# Patient Record
Sex: FEMALE | Race: WHITE | NOT HISPANIC OR LATINO | Employment: OTHER | URBAN - METROPOLITAN AREA
[De-identification: names, ages, dates, MRNs, and addresses within clinical notes are randomized per-mention and may not be internally consistent; named-entity substitution may affect disease eponyms.]

---

## 2022-09-21 ENCOUNTER — HOSPITAL ENCOUNTER (EMERGENCY)
Facility: HOSPITAL | Age: 72
Discharge: HOME | End: 2022-09-21
Attending: EMERGENCY MEDICINE
Payer: MEDICARE

## 2022-09-21 VITALS
DIASTOLIC BLOOD PRESSURE: 77 MMHG | RESPIRATION RATE: 16 BRPM | WEIGHT: 125 LBS | HEIGHT: 61 IN | TEMPERATURE: 97.5 F | SYSTOLIC BLOOD PRESSURE: 189 MMHG | HEART RATE: 78 BPM | OXYGEN SATURATION: 96 % | BODY MASS INDEX: 23.6 KG/M2

## 2022-09-21 DIAGNOSIS — T78.40XA ALLERGIC REACTION: Primary | ICD-10-CM

## 2022-09-21 LAB — GLUCOSE SERPL-MCNC: 304 MG/DL (ref 65–140)

## 2022-09-21 PROCEDURE — 82948 REAGENT STRIP/BLOOD GLUCOSE: CPT

## 2022-09-21 PROCEDURE — 99283 EMERGENCY DEPT VISIT LOW MDM: CPT

## 2022-09-21 PROCEDURE — 99285 EMERGENCY DEPT VISIT HI MDM: CPT | Performed by: PHYSICIAN ASSISTANT

## 2022-09-21 RX ORDER — DIPHENHYDRAMINE HCL 25 MG
50 TABLET ORAL ONCE
Status: COMPLETED | OUTPATIENT
Start: 2022-09-21 | End: 2022-09-21

## 2022-09-21 RX ORDER — ATORVASTATIN CALCIUM 20 MG/1
20 TABLET, FILM COATED ORAL DAILY
COMMUNITY

## 2022-09-21 RX ORDER — LOSARTAN POTASSIUM 50 MG/1
50 TABLET ORAL DAILY
COMMUNITY

## 2022-09-21 RX ADMIN — DIPHENHYDRAMINE HYDROCHLORIDE 25 MG: 25 TABLET ORAL at 10:41

## 2022-09-21 NOTE — ED NOTES
Patient refused 50 mg benadryl  25 mg benadryl administered        Edmar Herzog  09/21/22 1125 Sarah  09/21/22 1049

## 2022-09-21 NOTE — ED PROVIDER NOTES
History  Chief Complaint   Patient presents with    Allergic Reaction     Pt arrives after taking rybelsus @0730 and then feeling face tightness @ 0900      Patient is a 72 y/o F with h/o DM, Hyperlipidemia that presents to the ED with possible allergic reaction to Rybelsus  She states she took her first pill this morning at 7:30AM   AT 9:00AM she was eating and her face felt flushed and tight and she felt short of breath and nauseated  She called her PCP and was told to go to the ER because she is having an allergic reaction  Patient states she is feeling better  History provided by:  Patient  Allergic Reaction  Presenting symptoms: difficulty breathing    Presenting symptoms: no difficulty swallowing and no rash    Severity:  Mild  Duration:  1 hour  Prior allergic episodes:  No prior episodes  Context: medications    Relieved by:  Nothing  Worsened by:  Nothing  Ineffective treatments:  None tried      Prior to Admission Medications   Prescriptions Last Dose Informant Patient Reported? Taking? Semaglutide (Rybelsus) 3 MG TABS 9/21/2022 at Unknown time  Yes Yes   Sig: Take by mouth @0730   atorvastatin (LIPITOR) 20 mg tablet 9/20/2022 at Unknown time  Yes Yes   Sig: Take 20 mg by mouth daily   losartan (COZAAR) 50 mg tablet 9/21/2022 at Unknown time  Yes Yes   Sig: Take 50 mg by mouth daily   metFORMIN (GLUCOPHAGE) 500 mg tablet 9/20/2022 at Unknown time  Yes Yes   Sig: Take 500 mg by mouth 2 (two) times a day with meals      Facility-Administered Medications: None       History reviewed  No pertinent past medical history  History reviewed  No pertinent surgical history  History reviewed  No pertinent family history  I have reviewed and agree with the history as documented      E-Cigarette/Vaping    E-Cigarette Use Never User      E-Cigarette/Vaping Substances    Nicotine No     THC No     CBD No     Flavoring No     Other No     Unknown No      Social History     Tobacco Use    Smoking status: Never Smoker    Smokeless tobacco: Never Used   Vaping Use    Vaping Use: Never used       Review of Systems   Constitutional: Negative for chills and fever  HENT: Negative for facial swelling and trouble swallowing  Respiratory: Positive for shortness of breath  Gastrointestinal: Positive for nausea  Negative for diarrhea and vomiting  Skin: Negative for color change, pallor and rash  Neurological: Negative for dizziness, weakness, light-headedness and numbness  Psychiatric/Behavioral: Negative for confusion  The patient is nervous/anxious  All other systems reviewed and are negative  Physical Exam  Physical Exam  Vitals and nursing note reviewed  Constitutional:       General: She is not in acute distress  Appearance: Normal appearance  She is well-developed, well-groomed and normal weight  She is not ill-appearing or diaphoretic  HENT:      Head: Normocephalic and atraumatic  Comments: No facial swelling  Face is flushed  Right Ear: Hearing normal       Left Ear: Hearing normal       Nose: Nose normal       Mouth/Throat:      Mouth: Mucous membranes are moist       Pharynx: Oropharynx is clear  No pharyngeal swelling or uvula swelling  Comments: No tongue swelling  Eyes:      Conjunctiva/sclera: Conjunctivae normal       Pupils: Pupils are equal    Cardiovascular:      Rate and Rhythm: Normal rate and regular rhythm  Heart sounds: Normal heart sounds  Pulmonary:      Effort: Pulmonary effort is normal       Breath sounds: Wheezing (mild expiratory wheeze b/l bases  ) present  No decreased breath sounds, rhonchi or rales  Musculoskeletal:         General: Normal range of motion  Cervical back: Normal range of motion  Right lower leg: No edema  Left lower leg: No edema  Skin:     General: Skin is warm and dry  Coloration: Skin is not jaundiced or pale  Findings: No rash     Neurological:      General: No focal deficit present  Mental Status: She is alert and oriented to person, place, and time  Motor: No weakness  Psychiatric:         Mood and Affect: Mood normal          Behavior: Behavior is cooperative  Vital Signs  ED Triage Vitals [09/21/22 0943]   Temperature Pulse Respirations Blood Pressure SpO2   97 5 °F (36 4 °C) 94 20 (!) 188/98 99 %      Temp Source Heart Rate Source Patient Position - Orthostatic VS BP Location FiO2 (%)   Temporal Monitor Sitting Left arm --      Pain Score       No Pain           Vitals:    09/21/22 0943 09/21/22 1000 09/21/22 1100 09/21/22 1130   BP: (!) 188/98 166/86 (!) 195/79 (!) 189/77   Pulse: 94 93 84 78   Patient Position - Orthostatic VS: Sitting   Sitting         Visual Acuity      ED Medications  Medications   diphenhydrAMINE (BENADRYL) tablet 50 mg (25 mg Oral Given 9/21/22 1041)       Diagnostic Studies  Results Reviewed     Procedure Component Value Units Date/Time    Fingerstick Glucose (POCT) [[id number]]  (Abnormal) Collected: 09/21/22 0953    Lab Status: Final result Updated: 09/21/22 0954     POC Glucose 304 mg/dl                  No orders to display              Procedures  Procedures         ED Course  ED Course as of 09/21/22 1233   Wed Sep 21, 2022   1138 Patient states she is feeling much better  Lungs CTA, 98%RA  Facial flushing improved  SBIRT 20yo+    Flowsheet Row Most Recent Value   SBIRT (25 yo +)    In order to provide better care to our patients, we are screening all of our patients for alcohol and drug use  Would it be okay to ask you these screening questions? No Filed at: 09/21/2022 1000                    MDM  Number of Diagnoses or Management Options  Allergic reaction: new and does not require workup  Diagnosis management comments: Patient with reaction to Rybelsus, facial flushing, SOB, will order benadryl and monitor patient  Patient improved while in the ER, feeling better, will D/C    Instructed patient to avoid Rybelsus  Return precautions given  Patient Progress  Patient progress: improved      Disposition  Final diagnoses: Allergic reaction - Rybelsus     Time reflects when diagnosis was documented in both MDM as applicable and the Disposition within this note     Time User Action Codes Description Comment    9/21/2022 11:34 AM Evaristo Gama Add [T78 40XA] Allergic reaction     9/21/2022 11:34 AM Evaristo Gama Modify [T78 40XA] Allergic reaction Rybelsus      ED Disposition     ED Disposition   Discharge    Condition   Stable    Date/Time   Wed Sep 21, 2022 11:34 AM    Comment   Pauline Martin discharge to home/self care  Follow-up Information     Follow up With Specialties Details Why Contact Info    Your family doctor  Call today for medication adjustment           Discharge Medication List as of 9/21/2022 11:36 AM      CONTINUE these medications which have NOT CHANGED    Details   atorvastatin (LIPITOR) 20 mg tablet Take 20 mg by mouth daily, Historical Med      losartan (COZAAR) 50 mg tablet Take 50 mg by mouth daily, Historical Med      metFORMIN (GLUCOPHAGE) 500 mg tablet Take 500 mg by mouth 2 (two) times a day with meals, Historical Med      Semaglutide (Rybelsus) 3 MG TABS Take by mouth @0730, Historical Med             No discharge procedures on file      PDMP Review     None          ED Provider  Electronically Signed by           Heather Garcia PA-C  09/21/22 2486

## 2022-09-21 NOTE — DISCHARGE INSTRUCTIONS
AVoid Rybelsus  Continue benadryl 25 mg every 4-6 hours for next 24 hours  Return to ER if symptoms worsen  Call your family doctor today for medication adjustment